# Patient Record
Sex: MALE | Race: WHITE | NOT HISPANIC OR LATINO | Employment: FULL TIME | URBAN - METROPOLITAN AREA
[De-identification: names, ages, dates, MRNs, and addresses within clinical notes are randomized per-mention and may not be internally consistent; named-entity substitution may affect disease eponyms.]

---

## 2018-06-11 ENCOUNTER — OFFICE VISIT (OUTPATIENT)
Dept: FAMILY MEDICINE CLINIC | Facility: CLINIC | Age: 28
End: 2018-06-11

## 2018-06-11 VITALS
WEIGHT: 227 LBS | BODY MASS INDEX: 30.09 KG/M2 | DIASTOLIC BLOOD PRESSURE: 86 MMHG | HEIGHT: 73 IN | SYSTOLIC BLOOD PRESSURE: 126 MMHG

## 2018-06-11 DIAGNOSIS — E66.3 OVERWEIGHT: ICD-10-CM

## 2018-06-11 DIAGNOSIS — Z76.89 ESTABLISHING CARE WITH NEW DOCTOR, ENCOUNTER FOR: Primary | ICD-10-CM

## 2018-06-11 DIAGNOSIS — F43.9 STRESS: ICD-10-CM

## 2018-06-11 PROCEDURE — 99202 OFFICE O/P NEW SF 15 MIN: CPT | Performed by: INTERNAL MEDICINE

## 2018-06-11 NOTE — PROGRESS NOTES
28M here to establish care.    Current issues:    Some stress surrounding career related decisions - working as , thinking of switching focus/ going for doctorate - working this out - going to move back home during this transition.    Weight gain - thinks it is recent stress - felt well at 200lbs.  +exercise.    Review of Systems   General: no fatigue, fever/chills, unintentional wt loss, malaise, night sweats  Skin: no rash, no hives, no lesions,   Eyes: no visual disturbance   Heme: no brusing, no bleeding  ENT: no hearing loss, no dizziness, no nosebleed, no hoarseness  Endocrine: , no polyuria, polyphagia, polydipsia, no heat or cold intolerance  GI: no nausea, no vomiting, no diarrhea, no constipation, no bleeding, no pain  : no dysuria, no urinary frequency,, no hematuria, or incontinence  Extremities: no edema, , no claudication  Cardiac: no chest pain, no palpitations, no orthopnea, no PND  Respiratory: no cough, no sputum, no wheezing, no sob , no hemoptysis  Neuro: no headache, no seizure,, no paresthesias or weakness  Psych: no anxiety, no depression      Patient Active Problem List   Diagnosis   • Overweight      Past Surgical History:   Procedure Laterality Date   • WISDOM TOOTH EXTRACTION        No current outpatient prescriptions on file.   No Known Allergies  Social History     Social History   • Marital status: Single     Spouse name: N/A   • Number of children: N/A   • Years of education: N/A     Occupational History   •       Social History Main Topics   • Smoking status: Never Smoker   • Smokeless tobacco: Never Used   • Alcohol use Yes      Comment: occassional   • Drug use: No   • Sexual activity: No     Other Topics Concern   • Not on file     Social History Narrative    6/18:    Living alone.    Undergrad [employer].    Working as  - thinking about doctoral program    Exercise: yes, regular    Dental: utd    Eye: contacts, utd      Family History   Problem Relation Age of  "Onset   • Migraines Mother    • Osteoporosis Mother    • Hyperlipidemia Father       /86   Ht 185.4 cm (73\")   Wt 103 kg (227 lb)   BMI 29.95 kg/m²   Gen: well appearing in nad, no resp effort  Eyes: conjunctiva clear, perrl, eomi  ENT: mmm, no thyromegaly, no lymphadenopathy  CV: s1, s2 reg no m/r/g, no bruits, no jvd  No peripheral edema, pedal pulses intact  Resp:  clear b/l no w/r/r  GI:  soft nt/nd  Skin: no clubbing or cyanosis  Neuro: no focal deficits.    A/P    1. Overweight    2. Stress      Counseled on stress mgmt - pt feels he is doing ok - +exercising, managing.  Counseled on weight mgmt - diet, exercise, goals for wt    F/u as needed or in 1yr for cpe    Medical record request sent to Dr. Wallace office      "